# Patient Record
Sex: MALE | Race: WHITE | NOT HISPANIC OR LATINO | Employment: OTHER | URBAN - METROPOLITAN AREA
[De-identification: names, ages, dates, MRNs, and addresses within clinical notes are randomized per-mention and may not be internally consistent; named-entity substitution may affect disease eponyms.]

---

## 2024-11-12 ENCOUNTER — HOSPITAL ENCOUNTER (OUTPATIENT)
Dept: LAB | Facility: MEDICAL CENTER | Age: 84
End: 2024-11-12
Attending: PHYSICIAN ASSISTANT
Payer: MEDICARE

## 2024-11-12 LAB
ANISOCYTOSIS BLD QL SMEAR: ABNORMAL
BASOPHILS # BLD AUTO: 1.2 % (ref 0–1.8)
BASOPHILS # BLD: 0.09 K/UL (ref 0–0.12)
COMMENT 1642: NORMAL
EOSINOPHIL # BLD AUTO: 0.41 K/UL (ref 0–0.51)
EOSINOPHIL NFR BLD: 5.3 % (ref 0–6.9)
ERYTHROCYTE [DISTWIDTH] IN BLOOD BY AUTOMATED COUNT: 67.8 FL (ref 35.9–50)
HCT VFR BLD AUTO: 39.7 % (ref 42–52)
HGB BLD-MCNC: 13.7 G/DL (ref 14–18)
IMM GRANULOCYTES # BLD AUTO: 0.12 K/UL (ref 0–0.11)
IMM GRANULOCYTES NFR BLD AUTO: 1.6 % (ref 0–0.9)
LYMPHOCYTES # BLD AUTO: 1.83 K/UL (ref 1–4.8)
LYMPHOCYTES NFR BLD: 23.7 % (ref 22–41)
MCH RBC QN AUTO: 36.5 PG (ref 27–33)
MCHC RBC AUTO-ENTMCNC: 34.5 G/DL (ref 32.3–36.5)
MCV RBC AUTO: 105.9 FL (ref 81.4–97.8)
MICROCYTES BLD QL SMEAR: ABNORMAL
MONOCYTES # BLD AUTO: 0.56 K/UL (ref 0–0.85)
MONOCYTES NFR BLD AUTO: 7.3 % (ref 0–13.4)
MORPHOLOGY BLD-IMP: NORMAL
NEUTROPHILS # BLD AUTO: 4.71 K/UL (ref 1.82–7.42)
NEUTROPHILS NFR BLD: 60.9 % (ref 44–72)
NRBC # BLD AUTO: 0 K/UL
NRBC BLD-RTO: 0 /100 WBC (ref 0–0.2)
OVALOCYTES BLD QL SMEAR: NORMAL
PLATELET # BLD AUTO: 198 K/UL (ref 164–446)
PLATELET BLD QL SMEAR: NORMAL
PMV BLD AUTO: 11.4 FL (ref 9–12.9)
POIKILOCYTOSIS BLD QL SMEAR: NORMAL
RBC # BLD AUTO: 3.75 M/UL (ref 4.7–6.1)
RBC BLD AUTO: PRESENT
VARIANT LYMPHS BLD QL SMEAR: NORMAL
WBC # BLD AUTO: 7.7 K/UL (ref 4.8–10.8)

## 2024-11-12 PROCEDURE — 36415 COLL VENOUS BLD VENIPUNCTURE: CPT

## 2024-11-12 PROCEDURE — 84481 FREE ASSAY (FT-3): CPT

## 2024-11-12 PROCEDURE — 84443 ASSAY THYROID STIM HORMONE: CPT

## 2024-11-12 PROCEDURE — 84439 ASSAY OF FREE THYROXINE: CPT

## 2024-11-12 PROCEDURE — 84153 ASSAY OF PSA TOTAL: CPT | Mod: GA

## 2024-11-12 PROCEDURE — 80053 COMPREHEN METABOLIC PANEL: CPT

## 2024-11-12 PROCEDURE — 80061 LIPID PANEL: CPT

## 2024-11-12 PROCEDURE — 85025 COMPLETE CBC W/AUTO DIFF WBC: CPT

## 2024-11-13 LAB
ALBUMIN SERPL BCP-MCNC: 4.4 G/DL (ref 3.2–4.9)
ALBUMIN/GLOB SERPL: 1.8 G/DL
ALP SERPL-CCNC: 64 U/L (ref 30–99)
ALT SERPL-CCNC: 16 U/L (ref 2–50)
ANION GAP SERPL CALC-SCNC: 10 MMOL/L (ref 7–16)
AST SERPL-CCNC: 22 U/L (ref 12–45)
BILIRUB SERPL-MCNC: 0.9 MG/DL (ref 0.1–1.5)
BUN SERPL-MCNC: 17 MG/DL (ref 8–22)
CALCIUM ALBUM COR SERPL-MCNC: 9.4 MG/DL (ref 8.5–10.5)
CALCIUM SERPL-MCNC: 9.7 MG/DL (ref 8.5–10.5)
CHLORIDE SERPL-SCNC: 109 MMOL/L (ref 96–112)
CHOLEST SERPL-MCNC: 179 MG/DL (ref 100–199)
CO2 SERPL-SCNC: 24 MMOL/L (ref 20–33)
CREAT SERPL-MCNC: 1.09 MG/DL (ref 0.5–1.4)
GFR SERPLBLD CREATININE-BSD FMLA CKD-EPI: 67 ML/MIN/1.73 M 2
GLOBULIN SER CALC-MCNC: 2.5 G/DL (ref 1.9–3.5)
GLUCOSE SERPL-MCNC: 88 MG/DL (ref 65–99)
HDLC SERPL-MCNC: 77 MG/DL
LDLC SERPL CALC-MCNC: 87 MG/DL
POTASSIUM SERPL-SCNC: 4.4 MMOL/L (ref 3.6–5.5)
PROT SERPL-MCNC: 6.9 G/DL (ref 6–8.2)
PSA SERPL DL<=0.01 NG/ML-MCNC: 0.69 NG/ML (ref 0–4)
SODIUM SERPL-SCNC: 143 MMOL/L (ref 135–145)
T3FREE SERPL-MCNC: 2.81 PG/ML (ref 2–4.4)
T4 FREE SERPL-MCNC: 1.08 NG/DL (ref 0.93–1.7)
TRIGL SERPL-MCNC: 77 MG/DL (ref 0–149)
TSH SERPL-ACNC: 2.68 UIU/ML (ref 0.35–5.5)

## 2024-12-16 ENCOUNTER — HOSPITAL ENCOUNTER (OUTPATIENT)
Dept: LAB | Facility: MEDICAL CENTER | Age: 84
End: 2024-12-16
Attending: PHYSICIAN ASSISTANT
Payer: MEDICARE

## 2024-12-16 LAB
BASOPHILS # BLD AUTO: 1.1 % (ref 0–1.8)
BASOPHILS # BLD: 0.09 K/UL (ref 0–0.12)
EOSINOPHIL # BLD AUTO: 0.46 K/UL (ref 0–0.51)
EOSINOPHIL NFR BLD: 5.6 % (ref 0–6.9)
ERYTHROCYTE [DISTWIDTH] IN BLOOD BY AUTOMATED COUNT: 64.2 FL (ref 35.9–50)
FERRITIN SERPL-MCNC: 230 NG/ML (ref 22–322)
HCT VFR BLD AUTO: 39.4 % (ref 42–52)
HGB BLD-MCNC: 13.1 G/DL (ref 14–18)
IMM GRANULOCYTES # BLD AUTO: 0.06 K/UL (ref 0–0.11)
IMM GRANULOCYTES NFR BLD AUTO: 0.7 % (ref 0–0.9)
IRON SATN MFR SERPL: 51 % (ref 15–55)
IRON SERPL-MCNC: 135 UG/DL (ref 50–180)
LYMPHOCYTES # BLD AUTO: 2.26 K/UL (ref 1–4.8)
LYMPHOCYTES NFR BLD: 27.5 % (ref 22–41)
MCH RBC QN AUTO: 34.3 PG (ref 27–33)
MCHC RBC AUTO-ENTMCNC: 33.2 G/DL (ref 32.3–36.5)
MCV RBC AUTO: 103.1 FL (ref 81.4–97.8)
MONOCYTES # BLD AUTO: 0.49 K/UL (ref 0–0.85)
MONOCYTES NFR BLD AUTO: 6 % (ref 0–13.4)
NEUTROPHILS # BLD AUTO: 4.85 K/UL (ref 1.82–7.42)
NEUTROPHILS NFR BLD: 59.1 % (ref 44–72)
NRBC # BLD AUTO: 0 K/UL
NRBC BLD-RTO: 0 /100 WBC (ref 0–0.2)
PLATELET # BLD AUTO: 204 K/UL (ref 164–446)
PMV BLD AUTO: 11.1 FL (ref 9–12.9)
RBC # BLD AUTO: 3.82 M/UL (ref 4.7–6.1)
TIBC SERPL-MCNC: 263 UG/DL (ref 250–450)
UIBC SERPL-MCNC: 128 UG/DL (ref 110–370)
WBC # BLD AUTO: 8.2 K/UL (ref 4.8–10.8)

## 2024-12-16 PROCEDURE — 36415 COLL VENOUS BLD VENIPUNCTURE: CPT

## 2024-12-16 PROCEDURE — 85025 COMPLETE CBC W/AUTO DIFF WBC: CPT

## 2024-12-16 PROCEDURE — 83540 ASSAY OF IRON: CPT

## 2024-12-16 PROCEDURE — 82728 ASSAY OF FERRITIN: CPT

## 2024-12-16 PROCEDURE — 83550 IRON BINDING TEST: CPT

## 2025-03-07 ENCOUNTER — OFFICE VISIT (OUTPATIENT)
Dept: NEUROLOGY | Facility: MEDICAL CENTER | Age: 85
End: 2025-03-07
Attending: PSYCHIATRY & NEUROLOGY
Payer: MEDICARE

## 2025-03-07 VITALS
HEART RATE: 50 BPM | DIASTOLIC BLOOD PRESSURE: 64 MMHG | OXYGEN SATURATION: 94 % | HEIGHT: 65 IN | TEMPERATURE: 97.4 F | BODY MASS INDEX: 24.79 KG/M2 | WEIGHT: 148.81 LBS | SYSTOLIC BLOOD PRESSURE: 110 MMHG | RESPIRATION RATE: 16 BRPM

## 2025-03-07 DIAGNOSIS — F03.C2 SEVERE DEMENTIA WITH PSYCHOTIC DISTURBANCE, UNSPECIFIED DEMENTIA TYPE (HCC): Primary | ICD-10-CM

## 2025-03-07 PROCEDURE — 99211 OFF/OP EST MAY X REQ PHY/QHP: CPT | Performed by: PSYCHIATRY & NEUROLOGY

## 2025-03-07 PROCEDURE — 99205 OFFICE O/P NEW HI 60 MIN: CPT | Performed by: PSYCHIATRY & NEUROLOGY

## 2025-03-07 PROCEDURE — 3078F DIAST BP <80 MM HG: CPT | Performed by: PSYCHIATRY & NEUROLOGY

## 2025-03-07 PROCEDURE — G2212 PROLONG OUTPT/OFFICE VIS: HCPCS | Performed by: PSYCHIATRY & NEUROLOGY

## 2025-03-07 PROCEDURE — 3074F SYST BP LT 130 MM HG: CPT | Performed by: PSYCHIATRY & NEUROLOGY

## 2025-03-07 RX ORDER — METOPROLOL SUCCINATE 25 MG/1
TABLET, EXTENDED RELEASE ORAL
COMMUNITY
Start: 2024-09-13

## 2025-03-07 RX ORDER — AMLODIPINE BESYLATE 5 MG/1
TABLET ORAL
COMMUNITY
Start: 2024-08-15

## 2025-03-07 RX ORDER — SUMATRIPTAN 50 MG/1
TABLET, FILM COATED ORAL
COMMUNITY

## 2025-03-07 RX ORDER — ATORVASTATIN CALCIUM 10 MG/1
TABLET, FILM COATED ORAL
COMMUNITY
End: 2025-03-07

## 2025-03-07 RX ORDER — TAMSULOSIN HYDROCHLORIDE 0.4 MG/1
1 CAPSULE ORAL
COMMUNITY
Start: 2024-10-26

## 2025-03-07 RX ORDER — MIRABEGRON 50 MG/1
TABLET, FILM COATED, EXTENDED RELEASE ORAL
COMMUNITY
Start: 2024-12-05

## 2025-03-07 ASSESSMENT — MONTREAL COGNITIVE ASSESSMENT (MOCA)
11. FOR EACH PAIR OF WORDS, WHAT CATEGORY DO THEY BELONG TO (OUT OF 2): 0/2
9. REPEAT EACH SENTENCE: 0/2
3. DRAW A CLOCK: CONTOUR, NUMBERS, HANDS: 1/3
8. SERIAL SUBTRACTION OF 7S: 0/5
ORIENTATION SUBSCORE: 1/6
DELAYED RECALL SUBSCORE: 0/5
6. READ LIST OF DIGITS [FORWARD/BACKWARD]: 1/2
10. [FLUENCY] NAME WORDS STARTING WITH DESIGNATED LETTER: 0/1
1. ALTERNATING TRAIL MAKING: 0/1
2. COPY DRAWING: 0/1
WHAT IS THE VERSION OF MOCA ADMINISTERED: 8.1
WHAT IS THE TOTAL SCORE (OUT OF 30): 6
7. [VIGILENCE] TAP WHEN HEARING DESIGNATED LETTER: 0/1
ADD 1 POINT IF LESS THAN OR EQUAL TO 12 YR EDUCATION LEVEL: 0
4. NAME EACH OF THE THREE ANIMALS SHOWN: 3/3

## 2025-03-07 ASSESSMENT — PATIENT HEALTH QUESTIONNAIRE - PHQ9: CLINICAL INTERPRETATION OF PHQ2 SCORE: 0

## 2025-03-07 ASSESSMENT — FIBROSIS 4 INDEX: FIB4 SCORE: 2.24

## 2025-03-08 NOTE — PROGRESS NOTES
NEUROLOGY CONSULTATION    Referring: ARASELI Yi    SUBJECTIVE:    CC  Mr. Newton presents today from the office of ARASELI Yi, for consultation, with his daughter and wife, with a history of a relatively rapid progression of cognitive decline associated with severe gait ataxia.  History is gotten mostly from the patient's wife and daughter, though he does participate to a limited degree.    PIERRE Chatman is a very pleasant 84-year-old right-handed gentleman who recognizes that there are some memory difficulties.  His mental processing throughout the whole interview was incredibly slow.  Attention is severely curtailed.    According to family, his symptoms started just a couple of years ago and have progressed at a fairly consistent but rapid pace.  At present he has absolutely no short-term memory, he requires frequent reminders and cueing, and even then there is limited recall.  He repeats himself consistently.  Mental processing speed is slow.  Already he is having difficulty with faces and names, even with familiar family members he is confusing them.  With all of the difficulties, he seems to have little insight as to their significance, almost indifferent.  They have just moved here from [place], so the transition was difficult, but he still forgets from where they did move.  He still talks about going home, though he is not sure where that is.  He is confusing past and present events.    At home he is becoming less involved in routines, when asked to do so, he has difficulty completing them.  Things are getting misplaced.  Learning new tasks is now an impossibility.  Driving was stopped a while ago because he was getting lost, this is much to his chagrin.  Despite this fact, he is thinking about going out and buying a new car, asking everyone why he cannot drive.    There is significant distortions of behavior.  His actions are times inappropriate, he is hallucinating on a near daily basis.  The  "hallucinations are not frightening, usually human in nature, unfamiliar to him.  Even in conversation, his language can be inappropriately \"colorful\".  He has absolutely no insight as to when this happens.  He has become a bit more irritable.  Fortunately he is not impulsive, but he does wander incessantly.  He has not left the house with the stove on, water running, etc.    Personal hygiene is maintained, he may require some cueing to shower.  He dresses himself consistently though he is very slow doing all of this.  He is still independent with most of his other ADLs.  He is no longer capable of doing his medications.    Balance is severely curtailed.  He is slower to walk, stride length is shortened, his daughter describes a shuffling quality.  Though it was recommended he use a cane, even a walker, he refuses to do so.  He has gone to ground quite frequently.  Fortunately nothing has been broken.    Language has become profoundly curtailed.  Word finding is most noticeable, keeping pace in conversation has become impossible.  His responses to questions are very simplified and repetitive/perseverative.    Sleep has not been distorted, they denies signs or symptoms suggestive of JOSH or RBD.    He has never complained of a loss of taste or smell, does not suffer from constipation, there have been no issues with loss of dexterity with the hands and fingers, tremulousness, change in voice volume or clarity, difficulty swallowing, drooling, loss of consciousness, dizziness, headache, or focal motor or sensory distortions.    His workup to date has been limited, imaging studies have not been done.  He has not been treated.    Medical history: Remarkable for hypertension, CAD, asymptomatic stroke (found on head imaging studies in the past done for other reasons), and migraine headache.  There is no history of malignancy, autoimmune disease, diabetes, dyslipidemia, liver or kidney disease, blood dyscrasia, MS, seizures, " pulmonary disease or diagnosed neurodegenerative disease.    Surgical history: None of note.    Family history: He knows little of his father and his medical history, his mother never suffered from memory difficulties.  His son and daughter are both doing well.    Social history: He does not smoke, he does have a couple of alcoholic beverages every evening, this has been ongoing for most of his life.  He drank more when he was much younger.    He is on Norvasc 5 mg daily, Toprol-XL 25 mg daily, Flomax, Myrbetriq and Imitrex 50 mg as needed.    ROS:  Unreliable given the severity of his dementia.    OBJECTIVE:    Physical exam    He appears in no acute distress.  His vital signs are stable.  He is quite cooperative, his attire is somewhat disheveled.  His behavior is appropriate.  There is no malar rash or sialorrhea.  Dentition is maintained, there is no active periodontal disease.  The neck is supple.  Carotid pulses are present bilaterally without asymmetry or bruits.  Cardiac evaluation reveals a regular rhythm.  There is no lower extremity edema.  Feet and hands are clean and well-maintained.    Neurologic exam    He is oriented only to the month.  MoCA is 6/30.  Throughout the interview he is clearly indifferent as to the nature and severity of the deficits that he has.  He tends to make jokes about the difficulties themselves.  His mental processing speed is notably slowed.  There is no apraxia or inattention.  Multistep commands cannot be done unless continuous cueing is offered.  He is perseverative.  He has difficulty remembering his date of birth as well as his age.  He refuses to acknowledge that his wife's name is actually Rosangela, insisting that she was named Arthur.  He is field dependent.  Language complexity is limited.  Frontal release signs are present, there is no rostrocaudal extinction.  There is a left-sided inattention at times, he has difficulty crossing the midline from either  side.    PERRLA/EOMI, visual fields demonstrate a left sided visual inattention with double simultaneous stimulation.  He is perseverative with portions of the examination.  Sensory exam is intact to light touch.  There is notable dysarthria, but no bradykinesia or hypophonia.  Facial movements are symmetric.  The tongue and uvula are midline.  Jaw jerk is present.  Shoulder shrug and head rotation are slowed but symmetric.    Musculoskeletal exam reveals no tremulousness, but movements in general are slow.  Tone is increased throughout.  There is no asterixis or drift.  Strength is intact in all 4 extremities, except for mild weakness with hip flexion bilaterally.    Reflexes are quite brisk throughout, more so on the left side than on the right.  The left toe is upgoing, the right is downgoing.    He is very slow to stand, there is an apractic quality as he walks, stride length is shortened, he does not shuffle, but there is a steppage nature as he puts his feet down.  Station is widened.  He requires assistance.  He is stooped.  He can stand on his heels and toes with assistance, he cannot walk on his toes.  Tandem walking is an impossibility.  There is no appendicular dystaxia.  Repetitive movements are slowed, in the lower extremities more noticeable on the left.  The slowing seems to be symmetric in the upper extremities.    Sensory exam shows a left-sided inattention to light touch and pinprick with double simultaneous stimulation involving the upper extremities.  He feels temperature and pin less on the left side when compared to the right as well.  Vibration seems to be intact though answers become more inconsistent in the feet.  JPS cannot be accurately assessed.    ASSESSMENT AND PLAN:    1.  Severe dementia with psychotic disturbance, unspecified dementia type: I was very blunt with my assessment based on history and exam.  His disorder is unusual in terms of its speed of progression, earlier onset  language difficulties, behavioral abnormalities, postural instabilities as well as urinary control issues.  There is also the relative indifference as to the significance of these problems he is having on the part of the patient.  His examination is also focal suggestive of bilateral cortical dysfunction, on the left frontal and temporal, on the right frontal and parietal.    I spent some time talking with the patient, but especially his daughter and wife, in regards to my impressions, we talked about testing that needs to be done to rule out conditions that might be amenable to treatment, at least in the hope of a resting the progression he is suffering from.  They were told that he is not competent at this time.  Given the severity of his condition, there are not likely medications that will prove beneficial even with symptomatic mitigation.  Fortunately he is not severely depressed, the behaviors he is exhibiting, though inappropriate at times, are not inherently dangerous.  His wife does understand he needs to be watched continuously.    Differential diagnosis includes structural pathologies like CNS atherosclerotic disease and ischemic processes, NPH, as well as CAA.  The usual medical processes do need to be considered, including infection, inflammation, endocrine abnormalities and nutritional deficiencies.  From a neurodegenerative standpoint, I am leaning more towards Frontotemporal Disease rather than Alzheimer's Disease, Parkinson's Disease with Dementia, or Lewy body disease.    MRI scan of the brain will be ordered, there is no need for an EEG study being done, we talked briefly about PET scan of the brain if necessary.  Blood work will be ordered.  We will contact him with the results moving forwards.  They will continue to be active with him, encouraging physical activity, socialization as well as dietary habits.  Sleep hygiene can be maintained, though I would like to avoid sedating medicines if at  all possible.  He is not severely depressed, psychotropic medications for now are not required.  We will follow-up over the next several months as things progress.    Time: 80 minutes in total spent on patient care including pre-charting, record review, discussion with healthcare staff and documentation.  This includes face-to-face time for exam, review, discussion, as well as counseling and coordinating care.

## 2025-03-16 ENCOUNTER — HOSPITAL ENCOUNTER (OUTPATIENT)
Dept: RADIOLOGY | Facility: MEDICAL CENTER | Age: 85
End: 2025-03-16
Attending: PSYCHIATRY & NEUROLOGY
Payer: MEDICARE

## 2025-03-16 DIAGNOSIS — F03.C2 SEVERE DEMENTIA WITH PSYCHOTIC DISTURBANCE, UNSPECIFIED DEMENTIA TYPE (HCC): ICD-10-CM

## 2025-03-16 PROCEDURE — 70551 MRI BRAIN STEM W/O DYE: CPT

## 2025-04-17 ENCOUNTER — TELEPHONE (OUTPATIENT)
Dept: NEUROLOGY | Facility: MEDICAL CENTER | Age: 85
End: 2025-04-17
Payer: MEDICARE

## 2025-07-31 ENCOUNTER — OFFICE VISIT (OUTPATIENT)
Facility: MEDICAL CENTER | Age: 85
End: 2025-07-31
Attending: PSYCHIATRY & NEUROLOGY
Payer: MEDICARE

## 2025-07-31 VITALS
DIASTOLIC BLOOD PRESSURE: 68 MMHG | WEIGHT: 145 LBS | HEIGHT: 66 IN | HEART RATE: 56 BPM | OXYGEN SATURATION: 92 % | RESPIRATION RATE: 16 BRPM | BODY MASS INDEX: 23.3 KG/M2 | TEMPERATURE: 98 F | SYSTOLIC BLOOD PRESSURE: 114 MMHG

## 2025-07-31 DIAGNOSIS — F01.B2: Primary | ICD-10-CM

## 2025-07-31 RX ORDER — UBIDECARENONE 75 MG
100 CAPSULE ORAL DAILY
COMMUNITY

## 2025-07-31 ASSESSMENT — ENCOUNTER SYMPTOMS
FALLS: 0
LOSS OF CONSCIOUSNESS: 0
MEMORY LOSS: 1
SEIZURES: 0
DEPRESSION: 0
MYALGIAS: 1
INSOMNIA: 0
SENSORY CHANGE: 0

## 2025-07-31 ASSESSMENT — FIBROSIS 4 INDEX: FIB4 SCORE: 2.26

## 2025-07-31 NOTE — PROGRESS NOTES
Subjective     Compa Newton is a 85 y.o. male who presents with his wife Arthur (Rosangela) and daughter Pat, for 6-month follow-up, with a history of significant dementia with psychotic features, now status post MRI of the brain.  Unfortunately they never got blood work drawn.  Issues gotten from review of the records as well as discussions with everyone.     HPI    At the home, things seem to be doing well.  He continues to show no insight at all as to the nature or severity of the difficulties he is having.  They live in an independent living facility at [facility], with availability of assisted living and then eventually memory care with full-time care services.  He has been doing well, he no longer wanders since his daughter placed balance at the top of the door in the front.  He did wander once and fell in the dark.    He still has confusional episodes, hallucinating about several images of his wife being with him in the room.  He can become agitated though Arthur seems to be able to talk him down easily.  He has not been paranoid or suspicious.  Face and name associations are still difficult, even with family members.  He still confuses present and past events.    He feels unsteady, his legs have begun to hurt to a degree, but he has not been falling.  Incontinence at nighttime continues.  He has a good appetite, eats easily.  His wife puts his medicines out for him, he takes them consistently when given.    Frequent reminders are still necessary, he will repeat himself continually.  There were also some word finding difficulties that become frustrating for him.  He has difficulty maintaining attention and mental focus.  Performing home routines remains problematic, things are misplaced, left incomplete.  Learning new things has become an impossibility.  He is independent with his ADLs, does not require reminders about showering, gets himself dressed without issue.    MRI of the brain  "revealed generalized atrophy with ventriculomegaly ex vacuo and a significant amount of chronic microvascular ischemic change in the white matter of both cerebral hemispheres.    Medical, surgical and family histories are reviewed, there are no new drug allergies.  He does complain of bilateral lower extremity pain which seems to be worsened when he walks, there may be a slight degree of edema as well.  There is nothing radicular to this.  He denies tingling sensations, there have been no issues with loss of sensation and frequent injury of his feet.  He is on Norvasc, Flomax, vitamin B12, Toprol-XL, Myrbetriq and Imitrex as needed.    Review of Systems   Musculoskeletal:  Positive for myalgias. Negative for falls.   Neurological:  Negative for sensory change, seizures and loss of consciousness.   Psychiatric/Behavioral:  Positive for memory loss. Negative for depression. The patient does not have insomnia.    All other systems reviewed and are negative.    Objective     /68 (BP Location: Left arm, Patient Position: Sitting, BP Cuff Size: Adult)   Pulse (!) 56   Temp 36.7 °C (98 °F) (Temporal)   Resp 16   Ht 1.676 m (5' 6\")   Wt 65.8 kg (145 lb)   SpO2 92%   BMI 23.40 kg/m²      Physical Exam    He appears in no acute distress.  He is clean and appropriately dressed, quite cooperative.  Vital signs are stable.  There is no malar rash.  The neck is supple.  Cardiac evaluation is unremarkable.     Neurological Exam    He is oriented to the year and month only, he knows he is in an office but nothing else of our location.  Word-finding difficulties remain, paraphasic errors are not used but there is clear reduction in fluency.  He does name and repeat, mental processing speed is slowed, so takes him some time to get there.  Multistep commands are problematic.  He is perseverative and field dependent in conversation and action.  He repeats himself on multiple occasions when talking about his legs and the " discomfort.  He clearly has no clue as to the severity of the deficits that he has or what the nature is.  Still he is very jovial and easy in demeanor.  His mood is euthymic, affect mood appropriate.  He has difficulty with his age as well as date of birth.    PERRLA/EOMI, visual fields are grossly full, though there may be some slight left visual inattention; facial movements are symmetric.  Sensory exam is deferred.  There is no bulbar dysfunction, the tongue and uvula are midline.  Shoulder shrug is slowed but symmetric.  Bulbar function is intact, he is not dysarthric.  Facial movements are symmetric.    Musculoskeletal exam reveals normal tone, there is no tremor.  Movements in general are slow but no asymmetry is seen from side-to-side.  There is no tremulousness.  Reflex exam is deferred.    He is slow to stand but seems to move a bit more easily today.  Stride length is shortened, he does not shuffle, at times there is an apractic quality.  There is no appendicular dystaxia.  Repetitive movements are slowed but still symmetric.    Sensory exam revealed the left-sided inattention on double simultaneous stimulation.  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 Arthur seems to be able to distract her  easily when he does become more agitated or upset.  He is not violent, he does not wander with the changes they have made on their house.  He is eating regularly, seems to socialize consistently.  He is not overtly depressed, sleep has not been severely distorted either.  For now there is no need for medication.  They understand that this condition will progress.    We talked about the results of the MRI scan and why this would explain a lot of the symptoms that he has especially why things have progressed at a more rapid pace.  They will keep an eye on his balance since he has a much higher risk of  falling.    We will follow-up in 1 year.    Time: 40 minutes in total spent in patient care including.  From charting, record review, discussion with healthcare staff and documentation.  This includes face-to-face time for exam, review, discussion, as well as counseling and coordinating care.

## 2025-07-31 NOTE — ASSESSMENT & PLAN NOTE
We will continue his present regimen. Arthur seems to be able to distract her  easily when he does become more agitated or upset.  He is not violent, he does not wander with the changes they have made on their house.  He is eating regularly, seems to socialize consistently.  He is not overtly depressed, sleep has not been severely distorted either.  For now there is no need for medication.  They understand that this condition will progress.    We talked about the results of the MRI scan and why this would explain a lot of the symptoms that he has especially why things have progressed at a more rapid pace.  They will keep an eye on his balance since he has a much higher risk of falling.

## 2025-08-21 ENCOUNTER — APPOINTMENT (OUTPATIENT)
Dept: URBAN - METROPOLITAN AREA CLINIC 15 | Facility: CLINIC | Age: 85
Setting detail: DERMATOLOGY
End: 2025-08-21

## 2025-08-21 DIAGNOSIS — L81.4 OTHER MELANIN HYPERPIGMENTATION: ICD-10-CM

## 2025-08-21 DIAGNOSIS — D18.0 HEMANGIOMA: ICD-10-CM

## 2025-08-21 DIAGNOSIS — L57.0 ACTINIC KERATOSIS: ICD-10-CM

## 2025-08-21 DIAGNOSIS — L85.3 XEROSIS CUTIS: ICD-10-CM

## 2025-08-21 DIAGNOSIS — Z71.89 OTHER SPECIFIED COUNSELING: ICD-10-CM

## 2025-08-21 DIAGNOSIS — L82.1 OTHER SEBORRHEIC KERATOSIS: ICD-10-CM

## 2025-08-21 DIAGNOSIS — L57.8 OTHER SKIN CHANGES DUE TO CHRONIC EXPOSURE TO NONIONIZING RADIATION: ICD-10-CM | Status: INADEQUATELY CONTROLLED

## 2025-08-21 DIAGNOSIS — D485 NEOPLASM OF UNCERTAIN BEHAVIOR OF SKIN: ICD-10-CM

## 2025-08-21 DIAGNOSIS — Z85.828 PERSONAL HISTORY OF OTHER MALIGNANT NEOPLASM OF SKIN: ICD-10-CM

## 2025-08-21 DIAGNOSIS — D22 MELANOCYTIC NEVI: ICD-10-CM

## 2025-08-21 PROBLEM — D18.01 HEMANGIOMA OF SKIN AND SUBCUTANEOUS TISSUE: Status: ACTIVE | Noted: 2025-08-21

## 2025-08-21 PROBLEM — D22.9 MELANOCYTIC NEVI, UNSPECIFIED: Status: ACTIVE | Noted: 2025-08-21

## 2025-08-21 PROBLEM — D48.5 NEOPLASM OF UNCERTAIN BEHAVIOR OF SKIN: Status: ACTIVE | Noted: 2025-08-21

## 2025-08-21 PROCEDURE — ? BIOPSY BY SHAVE METHOD

## 2025-08-21 PROCEDURE — ? LIQUID NITROGEN

## 2025-08-21 PROCEDURE — ? COUNSELING

## 2025-08-21 ASSESSMENT — LOCATION DETAILED DESCRIPTION DERM
LOCATION DETAILED: RIGHT DORSAL WRIST
LOCATION DETAILED: RIGHT FOREHEAD
LOCATION DETAILED: RIGHT SUPERIOR FOREHEAD
LOCATION DETAILED: RIGHT ULNAR DORSAL HAND
LOCATION DETAILED: RIGHT INFERIOR FOREHEAD
LOCATION DETAILED: LEFT MEDIAL TRAPEZIAL NECK
LOCATION DETAILED: NASAL SUPRATIP
LOCATION DETAILED: RIGHT RADIAL DORSAL HAND
LOCATION DETAILED: LEFT MID PREAURICULAR CHEEK
LOCATION DETAILED: LEFT CRUS OF HELIX
LOCATION DETAILED: LEFT SUPERIOR HELIX
LOCATION DETAILED: LEFT MID TEMPLE
LOCATION DETAILED: RIGHT DISTAL DORSAL FOREARM
LOCATION DETAILED: LEFT INFERIOR LATERAL FOREHEAD

## 2025-08-21 ASSESSMENT — LOCATION SIMPLE DESCRIPTION DERM
LOCATION SIMPLE: RIGHT HAND
LOCATION SIMPLE: LEFT CHEEK
LOCATION SIMPLE: LEFT TEMPLE
LOCATION SIMPLE: POSTERIOR NECK
LOCATION SIMPLE: LEFT EAR
LOCATION SIMPLE: RIGHT FOREARM
LOCATION SIMPLE: RIGHT WRIST
LOCATION SIMPLE: NOSE
LOCATION SIMPLE: LEFT FOREHEAD
LOCATION SIMPLE: RIGHT FOREHEAD

## 2025-08-21 ASSESSMENT — LOCATION ZONE DERM
LOCATION ZONE: EAR
LOCATION ZONE: FACE
LOCATION ZONE: NECK
LOCATION ZONE: NOSE
LOCATION ZONE: ARM
LOCATION ZONE: HAND

## 2025-08-26 ENCOUNTER — HOSPITAL ENCOUNTER (OUTPATIENT)
Dept: LAB | Facility: MEDICAL CENTER | Age: 85
End: 2025-08-26
Attending: PHYSICIAN ASSISTANT
Payer: MEDICARE

## 2025-08-26 LAB
ALBUMIN SERPL BCP-MCNC: 4.1 G/DL (ref 3.2–4.9)
ALBUMIN/GLOB SERPL: 1.8 G/DL
ALP SERPL-CCNC: 61 U/L (ref 30–99)
ALT SERPL-CCNC: 12 U/L (ref 2–50)
ANION GAP SERPL CALC-SCNC: 11 MMOL/L (ref 7–16)
AST SERPL-CCNC: 23 U/L (ref 12–45)
BASOPHILS # BLD AUTO: 1.1 % (ref 0–1.8)
BASOPHILS # BLD: 0.09 K/UL (ref 0–0.12)
BILIRUB SERPL-MCNC: 1.1 MG/DL (ref 0.1–1.5)
BUN SERPL-MCNC: 11 MG/DL (ref 8–22)
CALCIUM ALBUM COR SERPL-MCNC: 8.8 MG/DL (ref 8.5–10.5)
CALCIUM SERPL-MCNC: 8.9 MG/DL (ref 8.5–10.5)
CHLORIDE SERPL-SCNC: 105 MMOL/L (ref 96–112)
CHOLEST SERPL-MCNC: 153 MG/DL (ref 100–199)
CO2 SERPL-SCNC: 23 MMOL/L (ref 20–33)
COMMENT 1642: NORMAL
CREAT SERPL-MCNC: 1.29 MG/DL (ref 0.5–1.4)
DACRYOCYTES BLD QL SMEAR: NORMAL
EOSINOPHIL # BLD AUTO: 0.29 K/UL (ref 0–0.51)
EOSINOPHIL NFR BLD: 3.6 % (ref 0–6.9)
ERYTHROCYTE [DISTWIDTH] IN BLOOD BY AUTOMATED COUNT: 68.6 FL (ref 35.9–50)
ERYTHROCYTE [SEDIMENTATION RATE] IN BLOOD BY WESTERGREN METHOD: 13 MM/HOUR (ref 0–20)
FASTING STATUS PATIENT QL REPORTED: NORMAL
FERRITIN SERPL-MCNC: 313 NG/ML (ref 22–322)
GFR SERPLBLD CREATININE-BSD FMLA CKD-EPI: 55 ML/MIN/1.73 M 2
GLOBULIN SER CALC-MCNC: 2.3 G/DL (ref 1.9–3.5)
GLUCOSE SERPL-MCNC: 87 MG/DL (ref 65–99)
HCT VFR BLD AUTO: 36.8 % (ref 42–52)
HDLC SERPL-MCNC: 66 MG/DL
HGB BLD-MCNC: 12.5 G/DL (ref 14–18)
IMM GRANULOCYTES # BLD AUTO: 0.1 K/UL (ref 0–0.11)
IMM GRANULOCYTES NFR BLD AUTO: 1.2 % (ref 0–0.9)
IRON SATN MFR SERPL: 44 % (ref 15–55)
IRON SERPL-MCNC: 104 UG/DL (ref 50–180)
LDLC SERPL CALC-MCNC: 74 MG/DL
LYMPHOCYTES # BLD AUTO: 1.53 K/UL (ref 1–4.8)
LYMPHOCYTES NFR BLD: 19.1 % (ref 22–41)
MAGNESIUM SERPL-MCNC: 2.3 MG/DL (ref 1.5–2.5)
MCH RBC QN AUTO: 35.2 PG (ref 27–33)
MCHC RBC AUTO-ENTMCNC: 34 G/DL (ref 32.3–36.5)
MCV RBC AUTO: 103.7 FL (ref 81.4–97.8)
MONOCYTES # BLD AUTO: 0.48 K/UL (ref 0–0.85)
MONOCYTES NFR BLD AUTO: 6 % (ref 0–13.4)
MORPHOLOGY BLD-IMP: NORMAL
NEUTROPHILS # BLD AUTO: 5.53 K/UL (ref 1.82–7.42)
NEUTROPHILS NFR BLD: 69 % (ref 44–72)
NRBC # BLD AUTO: 0 K/UL
NRBC BLD-RTO: 0 /100 WBC (ref 0–0.2)
OVALOCYTES BLD QL SMEAR: NORMAL
PLATELET # BLD AUTO: 223 K/UL (ref 164–446)
PLATELET BLD QL SMEAR: NORMAL
PMV BLD AUTO: 11.2 FL (ref 9–12.9)
POIKILOCYTOSIS BLD QL SMEAR: NORMAL
POTASSIUM SERPL-SCNC: 4.2 MMOL/L (ref 3.6–5.5)
PROT SERPL-MCNC: 6.4 G/DL (ref 6–8.2)
RBC # BLD AUTO: 3.55 M/UL (ref 4.7–6.1)
RBC BLD AUTO: PRESENT
SODIUM SERPL-SCNC: 139 MMOL/L (ref 135–145)
T3FREE SERPL-MCNC: 2.48 PG/ML (ref 2–4.4)
T4 FREE SERPL-MCNC: 1.09 NG/DL (ref 0.93–1.7)
TIBC SERPL-MCNC: 235 UG/DL (ref 250–450)
TRIGL SERPL-MCNC: 65 MG/DL (ref 0–149)
TSH SERPL-ACNC: 2.4 UIU/ML (ref 0.38–5.33)
UIBC SERPL-MCNC: 131 UG/DL (ref 110–370)
WBC # BLD AUTO: 8 K/UL (ref 4.8–10.8)

## 2025-08-26 PROCEDURE — 84481 FREE ASSAY (FT-3): CPT

## 2025-08-26 PROCEDURE — 80061 LIPID PANEL: CPT

## 2025-08-26 PROCEDURE — 83550 IRON BINDING TEST: CPT | Mod: GA

## 2025-08-26 PROCEDURE — 80053 COMPREHEN METABOLIC PANEL: CPT

## 2025-08-26 PROCEDURE — 82728 ASSAY OF FERRITIN: CPT | Mod: GA

## 2025-08-26 PROCEDURE — 84443 ASSAY THYROID STIM HORMONE: CPT

## 2025-08-26 PROCEDURE — 36415 COLL VENOUS BLD VENIPUNCTURE: CPT

## 2025-08-26 PROCEDURE — 83540 ASSAY OF IRON: CPT | Mod: GA

## 2025-08-26 PROCEDURE — 83735 ASSAY OF MAGNESIUM: CPT

## 2025-08-26 PROCEDURE — 85025 COMPLETE CBC W/AUTO DIFF WBC: CPT

## 2025-08-26 PROCEDURE — 84439 ASSAY OF FREE THYROXINE: CPT

## 2025-08-26 PROCEDURE — 85652 RBC SED RATE AUTOMATED: CPT
